# Patient Record
Sex: MALE | Race: WHITE
[De-identification: names, ages, dates, MRNs, and addresses within clinical notes are randomized per-mention and may not be internally consistent; named-entity substitution may affect disease eponyms.]

---

## 2019-08-01 ENCOUNTER — HOSPITAL ENCOUNTER (EMERGENCY)
Dept: HOSPITAL 59 - ER | Age: 54
Discharge: HOME | End: 2019-08-01
Payer: MEDICAID

## 2019-08-01 DIAGNOSIS — Z79.84: ICD-10-CM

## 2019-08-01 DIAGNOSIS — E11.9: ICD-10-CM

## 2019-08-01 DIAGNOSIS — R60.0: Primary | ICD-10-CM

## 2019-08-01 PROCEDURE — 99283 EMERGENCY DEPT VISIT LOW MDM: CPT

## 2019-08-01 NOTE — EMERGENCY DEPARTMENT RECORD
History of Present Illness





- General


Chief complaint: Lower Extremity Pain


Stated complaint: POSSIBLE BLOOD CLOT


Time Seen by Provider: 19 18:32


Source: Patient


Mode of Arrival: Ambulatory


Limitations: No limitations





- History of Present Illness


Initial comments: 





53 yo male presents to ED for evaluation of a possible DVT to the right lower 

extremity.  Patient reports mdeial/lateral knee pain symptoms for approximately 

2 weeks, was at his chiropractor earlier today who was concerned about his 

swelling of the lower extremity.  Patient denies fevers, chills, redness, or 

recent illness.  Patient similarly denies any recent injury.  Patient denies 

previous history of DVT or immobilization, reports a history of NIDDM at his 

baseline.


MD Complaint: Extremity swelling


Onset/Timin


-: Week(s)


Location: Right, Lower Leg


History of Same: No


Consistency: Constant


Worsens with: Nothing


Associated Symptoms: Denies other symptoms





- Related Data


                                Home Medications











 Medication  Instructions  Recorded  Confirmed  Last Taken


 


Fenofibrate Nanocrystallized 48 mg PO DAILY 19





[Tricor]    


 


Glimepiride [Amaryl] 2 mg PO BID 19


 


Hydrocodone/Acetaminophen [Norco 1 each PO ASDIR 19 Unknown





 Tablet]    


 


Metformin HCl [Glucophage] 500 mg PO BID 19


 


Methadone HCl 2 mg PO DAILY 19 Unknown











                                    Allergies











Allergy/AdvReac Type Severity Reaction Status Date / Time


 


codeine AdvReac  VOMITING Verified 19 18:34














Review of Systems


Constitutional: Denies: Chills, Fever, Malaise, Night sweats


Eyes: Denies: Eye discharge, Eye pain


ENT: Denies: Congestion, Ear pain, Epistaxis


Respiratory: Denies: Cough, Dyspnea


Cardiovascular: Reports: Edema.  Denies: Chest pain, Dyspnea on exertion


Endocrine: Denies: Fatigue, Heat or cold intolerance


Gastrointestinal: Denies: Abdominal pain, Nausea, Vomiting


Genitourinary: Denies: Incontinence, Retention


Musculoskeletal: Denies: Arthralgia, Back pain


Skin: Denies: Bruising, Change in color


Neurological: Denies: Abnormal gait, Confusion, Headache, Numbness, Tingling


Psychiatric: Denies: Anxiety


Hematological/Lymphatic: Denies: Anemia, Blood Clots, Easy bleeding, Easy 

bruising





Physical Exam





- General


General Appearance: Alert, Oriented x3, Cooperative, No acute distress


Limitations: No limitations





- Head


Head exam: Atraumatic, Normocephalic, Normal inspection


Head exam detail: negative: Abrasion, Contusion, Fuller's sign, General 

tenderness, Hematoma, Laceration





- Eye


Eye exam: Normal appearance.  negative: Conjunctival injection, Periorbital 

swelling, Periorbital tenderness, Scleral icterus





- ENT


Ear exam: negative: Auricular hematoma, Auricular trauma


Nasal Exam: negative: Active bleeding, Discharge, Dried blood, Foreign body


Mouth exam: negative: Drooling, Laceration, Muffled voice, Tongue elevation





- Neck


Neck exam: Normal inspection.  negative: Meningismus, Tenderness





- Respiratory


Respiratory exam: Normal lung sounds bilaterally.  negative: Respiratory 

distress, Rhonchi, Stridor, Wheezes





- Cardiovascular


Cardiovascular Exam: Regular rate, Normal rhythm, Normal heart sounds





- GI/Abdominal


GI/Abdominal exam: Soft.  negative: Rebound, Rigid, Tenderness





- Rectal


Rectal exam: Deferred





- 


 exam: Deferred





- Extremities


Extremities exam: Pedal edema (1+ edema RLE compared with the left, mild TTP 

along the MCL, LCL of the knee, no specific calf/popliteal calf pain symptoms.  

No clinical evidence for infection on examination.).  negative: Calf tenderness





- Back


Back exam: Denies: CVA tenderness (R), CVA tenderness (L)





- Neurological


Neurological exam: Alert, Normal gait, Oriented X3





- Psychiatric


Psychiatric exam: Normal affect, Normal mood





- Skin


Skin exam: Normal color.  negative: Abrasion


Type of lesion: negative: abrasion





Course





- Reevaluation(s)


Reevaluation #1: 





19 19:27


Lower extremity Doppler RLE:


No evidence for DVT on examination


Mild edema present





Patient was updated on all results, findings appear c/w edema related to 

inflammatory changes of the knee.


Patient was given instructions for symptomatic care at home, appears stable for 

discharge at this time.





Disposition


Disposition: Discharge


Clinical Impression: 


 Lower extremity edema





Disposition: Home, Self-Care


Condition: (2) Stable


Instructions:  Edema (ED)


Additional Instructions: 


Return to ED if your symptoms worsen of if you have any concerns.


Keep your right lower extremity elevated at night to reduce swelling.


Follow-up with your family doctor in 3-5 days as directed.


Forms:  Patient Portal Access


Time of Disposition: 19:29





Quality





- Quality Measures


Quality Measures: N/A





- Blood Pressure Screening


Does Patient Have Any of the Following: No


Blood Pressure Classification: Hypertensive Reading


Systolic Measurement: 151


Diastolic Measurement: 77


Screening for High Blood Pressure: < First Hypertensive BP, F/U Documented > 

[]


First Hypertensive Follow-up Interventions: Referral to alternative/primary care

 provider.

## 2019-08-04 NOTE — US VENOUS DOPPLER REPORT
EXAM:  ULTRASOUND VENOUS DOPPLER LOWER EXT RT



HISTORY:  RIGHT LEG SWELLING. 



TECHNIQUE:  Duplex Doppler ultrasound of the right lower extremity with and 
without compression, and spectral waveform analysis.



FINDINGS:  Normal compression of the right lower extremity deep veins to the 
level of the popliteal vein. No evidence for DVT in the right common femoral, 
femoral, or popliteal vein. The calf veins are not well assessed without 
compression. No evidence for DVT in the left common femoral vein. Normal 
respiratory phasicity in the right common femoral vein on spectral analysis.  



IMPRESSION:  

NO EVIDENCE FOR RIGHT LOWER EXTREMITY DEEP VENOUS THROMBOSIS. 



JOB NUMBER:  [id number]

[facility]